# Patient Record
Sex: FEMALE | Race: BLACK OR AFRICAN AMERICAN | NOT HISPANIC OR LATINO | Employment: FULL TIME | URBAN - METROPOLITAN AREA
[De-identification: names, ages, dates, MRNs, and addresses within clinical notes are randomized per-mention and may not be internally consistent; named-entity substitution may affect disease eponyms.]

---

## 2017-10-24 DIAGNOSIS — Z12.39 SCREENING BREAST EXAMINATION: ICD-10-CM

## 2017-10-24 DIAGNOSIS — Z00.00 ROUTINE GENERAL MEDICAL EXAMINATION AT A HEALTH CARE FACILITY: ICD-10-CM

## 2017-10-24 DIAGNOSIS — Z13.820 SCREENING FOR OSTEOPOROSIS: Primary | ICD-10-CM

## 2017-10-30 DIAGNOSIS — Z12.39 SCREENING BREAST EXAMINATION: Primary | ICD-10-CM

## 2017-10-31 ENCOUNTER — OFFICE VISIT (OUTPATIENT)
Dept: UROGYNECOLOGY | Facility: CLINIC | Age: 57
End: 2017-10-31
Payer: COMMERCIAL

## 2017-10-31 ENCOUNTER — HOSPITAL ENCOUNTER (OUTPATIENT)
Dept: RADIOLOGY | Facility: HOSPITAL | Age: 57
Discharge: HOME | End: 2017-10-31
Attending: INTERNAL MEDICINE
Payer: COMMERCIAL

## 2017-10-31 ENCOUNTER — CLINICAL SUPPORT (OUTPATIENT)
Dept: INTERNAL MEDICINE | Facility: CLINIC | Age: 57
End: 2017-10-31
Payer: COMMERCIAL

## 2017-10-31 ENCOUNTER — HOSPITAL ENCOUNTER (OUTPATIENT)
Dept: CARDIOLOGY | Facility: CLINIC | Age: 57
Discharge: HOME | End: 2017-10-31
Payer: COMMERCIAL

## 2017-10-31 ENCOUNTER — HOSPITAL ENCOUNTER (OUTPATIENT)
Dept: RADIOLOGY | Facility: CLINIC | Age: 57
Discharge: HOME | End: 2017-10-31
Payer: COMMERCIAL

## 2017-10-31 ENCOUNTER — OFFICE VISIT (OUTPATIENT)
Dept: INTERNAL MEDICINE | Facility: CLINIC | Age: 57
End: 2017-10-31
Payer: COMMERCIAL

## 2017-10-31 VITALS
SYSTOLIC BLOOD PRESSURE: 124 MMHG | WEIGHT: 235 LBS | DIASTOLIC BLOOD PRESSURE: 60 MMHG | BODY MASS INDEX: 34.8 KG/M2 | HEIGHT: 69 IN

## 2017-10-31 VITALS
DIASTOLIC BLOOD PRESSURE: 86 MMHG | SYSTOLIC BLOOD PRESSURE: 126 MMHG | WEIGHT: 234.56 LBS | TEMPERATURE: 98 F | HEART RATE: 74 BPM | RESPIRATION RATE: 15 BRPM | HEIGHT: 69 IN | BODY MASS INDEX: 34.74 KG/M2

## 2017-10-31 DIAGNOSIS — N81.11 MIDLINE CYSTOCELE: ICD-10-CM

## 2017-10-31 DIAGNOSIS — Z01.419 WELL WOMAN EXAM: Primary | ICD-10-CM

## 2017-10-31 DIAGNOSIS — N81.6 RECTOCELE: ICD-10-CM

## 2017-10-31 DIAGNOSIS — E78.5 HYPERLIPIDEMIA, UNSPECIFIED HYPERLIPIDEMIA TYPE: ICD-10-CM

## 2017-10-31 DIAGNOSIS — Z00.00 ROUTINE GENERAL MEDICAL EXAMINATION AT A HEALTH CARE FACILITY: ICD-10-CM

## 2017-10-31 DIAGNOSIS — Z12.39 SCREENING BREAST EXAMINATION: ICD-10-CM

## 2017-10-31 DIAGNOSIS — N95.2 VAGINAL ATROPHY: ICD-10-CM

## 2017-10-31 DIAGNOSIS — I10 BENIGN ESSENTIAL HYPERTENSION: ICD-10-CM

## 2017-10-31 DIAGNOSIS — Z00.00 ANNUAL PHYSICAL EXAM: Primary | ICD-10-CM

## 2017-10-31 DIAGNOSIS — M85.89 OSTEOPENIA OF MULTIPLE SITES: ICD-10-CM

## 2017-10-31 DIAGNOSIS — N99.3 VAGINAL VAULT PROLAPSE AFTER HYSTERECTOMY: ICD-10-CM

## 2017-10-31 DIAGNOSIS — Z00.00 ROUTINE GENERAL MEDICAL EXAMINATION AT A HEALTH CARE FACILITY: Primary | ICD-10-CM

## 2017-10-31 DIAGNOSIS — Z13.820 SCREENING FOR OSTEOPOROSIS: ICD-10-CM

## 2017-10-31 LAB
ALBUMIN SERPL BCP-MCNC: 3.3 G/DL
ALP SERPL-CCNC: 103 U/L
ALT SERPL W/O P-5'-P-CCNC: 12 U/L
ANION GAP SERPL CALC-SCNC: 9 MMOL/L
AST SERPL-CCNC: 16 U/L
BILIRUB SERPL-MCNC: 0.4 MG/DL
BILIRUB UR QL STRIP: NEGATIVE
BUN SERPL-MCNC: 13 MG/DL
CALCIUM SERPL-MCNC: 9.7 MG/DL
CHLORIDE SERPL-SCNC: 105 MMOL/L
CHOLEST SERPL-MCNC: 239 MG/DL
CHOLEST/HDLC SERPL: 3.7 {RATIO}
CLARITY UR REFRACT.AUTO: CLEAR
CO2 SERPL-SCNC: 27 MMOL/L
COLOR UR AUTO: NORMAL
CREAT SERPL-MCNC: 1 MG/DL
ERYTHROCYTE [DISTWIDTH] IN BLOOD BY AUTOMATED COUNT: 13.2 %
EST. GFR  (AFRICAN AMERICAN): >60 ML/MIN/1.73 M^2
EST. GFR  (NON AFRICAN AMERICAN): >60 ML/MIN/1.73 M^2
GLUCOSE SERPL-MCNC: 100 MG/DL
GLUCOSE UR QL STRIP: NEGATIVE
HCT VFR BLD AUTO: 38.1 %
HDLC SERPL-MCNC: 65 MG/DL
HDLC SERPL: 27.2 %
HGB BLD-MCNC: 11.9 G/DL
HGB UR QL STRIP: NEGATIVE
KETONES UR QL STRIP: NEGATIVE
LDLC SERPL CALC-MCNC: 152.2 MG/DL
LEUKOCYTE ESTERASE UR QL STRIP: NEGATIVE
MCH RBC QN AUTO: 26.7 PG
MCHC RBC AUTO-ENTMCNC: 31.2 G/DL
MCV RBC AUTO: 86 FL
NITRITE UR QL STRIP: NEGATIVE
NONHDLC SERPL-MCNC: 174 MG/DL
PH UR STRIP: 5 [PH] (ref 5–8)
PLATELET # BLD AUTO: 326 K/UL
PMV BLD AUTO: 10.5 FL
POTASSIUM SERPL-SCNC: 4.3 MMOL/L
PROT SERPL-MCNC: 7.7 G/DL
PROT UR QL STRIP: NEGATIVE
RBC # BLD AUTO: 4.45 M/UL
SODIUM SERPL-SCNC: 141 MMOL/L
SP GR UR STRIP: 1.01 (ref 1–1.03)
TRIGL SERPL-MCNC: 109 MG/DL
URN SPEC COLLECT METH UR: NORMAL
UROBILINOGEN UR STRIP-ACNC: NEGATIVE EU/DL
WBC # BLD AUTO: 6.58 K/UL

## 2017-10-31 PROCEDURE — 93000 ELECTROCARDIOGRAM COMPLETE: CPT | Mod: S$GLB,,, | Performed by: INTERNAL MEDICINE

## 2017-10-31 PROCEDURE — 85027 COMPLETE CBC AUTOMATED: CPT

## 2017-10-31 PROCEDURE — 81003 URINALYSIS AUTO W/O SCOPE: CPT

## 2017-10-31 PROCEDURE — 77067 SCR MAMMO BI INCL CAD: CPT | Mod: 26,,, | Performed by: RADIOLOGY

## 2017-10-31 PROCEDURE — 77067 SCR MAMMO BI INCL CAD: CPT | Mod: TC

## 2017-10-31 PROCEDURE — 36415 COLL VENOUS BLD VENIPUNCTURE: CPT

## 2017-10-31 PROCEDURE — 99999 PR PBB SHADOW E&M-EST. PATIENT-LVL III: CPT | Mod: PBBFAC,,, | Performed by: NURSE PRACTITIONER

## 2017-10-31 PROCEDURE — G0101 CA SCREEN;PELVIC/BREAST EXAM: HCPCS | Mod: S$GLB,,, | Performed by: NURSE PRACTITIONER

## 2017-10-31 PROCEDURE — 80053 COMPREHEN METABOLIC PANEL: CPT

## 2017-10-31 PROCEDURE — 99999 PR PBB SHADOW E&M-EST. PATIENT-LVL III: CPT | Mod: PBBFAC,,, | Performed by: INTERNAL MEDICINE

## 2017-10-31 PROCEDURE — 77063 BREAST TOMOSYNTHESIS BI: CPT | Mod: 26,,, | Performed by: RADIOLOGY

## 2017-10-31 PROCEDURE — 99396 PREV VISIT EST AGE 40-64: CPT | Mod: S$GLB,,, | Performed by: INTERNAL MEDICINE

## 2017-10-31 PROCEDURE — 77080 DXA BONE DENSITY AXIAL: CPT | Mod: TC

## 2017-10-31 PROCEDURE — 77080 DXA BONE DENSITY AXIAL: CPT | Mod: 26,,, | Performed by: INTERNAL MEDICINE

## 2017-10-31 PROCEDURE — 80061 LIPID PANEL: CPT

## 2017-10-31 RX ORDER — METHOCARBAMOL 500 MG/1
500 TABLET, FILM COATED ORAL 4 TIMES DAILY
COMMUNITY
End: 2018-11-28

## 2017-10-31 RX ORDER — TRIAMTERENE AND HYDROCHLOROTHIAZIDE 37.5; 25 MG/1; MG/1
CAPSULE ORAL
COMMUNITY
Start: 2017-10-18

## 2017-10-31 RX ORDER — GABAPENTIN 300 MG/1
300 CAPSULE ORAL 3 TIMES DAILY
COMMUNITY
End: 2017-10-31

## 2017-10-31 RX ORDER — KETOCONAZOLE 20 MG/ML
SHAMPOO, SUSPENSION TOPICAL
COMMUNITY
Start: 2017-09-19

## 2017-10-31 RX ORDER — ETODOLAC 300 MG/1
300 CAPSULE ORAL 2 TIMES DAILY
COMMUNITY
End: 2017-10-31

## 2017-10-31 RX ORDER — DESONIDE 0.5 MG/G
OINTMENT TOPICAL
COMMUNITY
Start: 2017-07-28

## 2017-10-31 NOTE — PATIENT INSTRUCTIONS
1. Well woman  --mammogram schedule  --colonoscopy due by 2018    2. Cystocele stage 1, rectocele stage 1, vaginal vault prolpase stage 1  --asymptomatic at this time  --no lifting > 50 pounds without assistance, no straining with bowel movements    3. RTC 1 year

## 2017-10-31 NOTE — PROGRESS NOTES
INTERNAL MEDICINE ANNUAL VISIT NOTE      CHIEF COMPLAINT     Executive Health    HPI     Anne Marie Sanchezite is a 57 y.o. AA female who presents for [facility].    H/o palpitations-->toprol by cardiologist Dr. Gunn-->tiredness and decreased libido-->stopped med.     HTN - on triamterene-hctz 37.5-25mg daily. BP goes up when she has pain.      Osteoporosis on DEXA 2015 - boniva monthly ordered; pt didn't end up taking it. Just went to dentist this yr. Has been taking OTC multivitamins.  dexa today. Results pending.    Had fall at work - slipped on oily substance. Since then she's been having numbness in her hand, neck and shoulder area. Follows w/ orthopedist. Part of WC. Was on etodolac x 11 mo-->vomiting blood and diarrhea. Scheduled for EGD next Friday in [facility]. Also w/ R hip pain. Doing PT 3x/week. Reports did have some improvement but still w/ the pain.   Has PCP in [place] that she follows regularly.      Past Medical History:  Past Medical History:   Diagnosis Date    Fibroid tumor     Murmur, cardiac     Sleep apnea     TIA (transient ischemic attack)        Past Surgical History:  Past Surgical History:   Procedure Laterality Date    CHOLECYSTECTOMY      HYSTERECTOMY      TONSILLECTOMY, ADENOIDECTOMY         Allergies:  Review of patient's allergies indicates:  No Known Allergies    Home Medications:  Prior to Admission medications    Medication Sig Start Date End Date Taking? Authorizing Provider   desonide 0.05% (DESOWEN) 0.05 % Oint  7/28/17   Historical Provider, MD   etodolac (LODINE) 300 MG Cap Take 300 mg by mouth 2 (two) times daily.    Historical Provider, MD   gabapentin (NEURONTIN) 300 MG capsule Take 300 mg by mouth 3 (three) times daily.    Historical Provider, MD   ibandronate (BONIVA) 150 mg tablet 1 tab once monthly on empty stomach with a glass of water and stay upright for 60 minutes 10/21/16   Zuleima Stanton MD   ketoconazole (NIZORAL) 2 % shampoo  9/19/17   Historical  "Provider, MD   methocarbamol (ROBAXIN) 500 MG Tab Take 500 mg by mouth 4 (four) times daily.    Historical Provider, MD   triamterene-hydrochlorothiazide 37.5-25 mg (DYAZIDE) 37.5-25 mg per capsule  10/18/17   Historical Provider, MD       Family History:  Family History   Problem Relation Age of Onset    Clotting disorder Sister     Cancer Mother 62     breast CA    Breast cancer Mother     Cancer Father      prostate CA    Peripheral vascular disease Father     Prostate cancer Father     Ovarian cancer Neg Hx     Cervical cancer Neg Hx     Endometrial cancer Neg Hx     Vaginal cancer Neg Hx        Social History:  Social History   Substance Use Topics    Smoking status: Never Smoker    Smokeless tobacco: Never Used    Alcohol use Yes      Comment: occasionally       Review of Systems:  Review of Systems Comprehensive review of systems otherwise negative. See history/subjective section for more details.    PHYSICAL EXAM     /86   Pulse 74   Temp 98.4 °F (36.9 °C) (Oral)   Resp 15   Ht 5' 9" (1.753 m)   Wt 106.4 kg (234 lb 9.1 oz)   BMI 34.64 kg/m²     GEN - A+OX4, NAD   HEENT - PERRL, EOMI, OP clear. MMM.   Neck - No thyromegaly or cervical LAD. No thyroid masses felt.  CV - RRR, no m/r   Chest - CTAB, no wheezing or rhonchi  Abd - S/NT/ND/+BS.   Ext - 2+BDP and radial pulses. No LE edema.   Neuro - PERRL, EOMI, no nystagmus, eyebrow raise, facial sensation, hearing, m of mastication, smile, palatal raise, shoulder shrug, tongue protrusion symmetric and intact. 5/5 BUE and BLE strength.   Skin - No rash.    LABS     Previous labs reviewed.    ASSESSMENT/PLAN     Anne Marie Deleon is a 57 y.o. female with  Anne Marie was seen today for [facility] and [facility].    Diagnoses and all orders for this visit:    Annual physical exam - labs reviewed.     Benign essential hypertension - Stable and controlled. Continue current medications.    Hyperlipidemia, unspecified hyperlipidemia type - " discussed diet control.     Osteopenia of multiple sites - T score improved w/o bisphosphonates. Ca and Vit D. Light weight exercises as tolerated.     Pt will f/u w/ her doctors associated with workman's comp for the back and neck issues.    Follow up with your PCP in [place] for mild anemia - hgb 11.9.    RTC in 12 months, sooner if needed and depending on labs.    Zuleima Stanton MD  Department of Internal Medicine - [facility]  11:30 AM

## 2017-10-31 NOTE — LETTER
November 1, 2017    Anne Marie Favorite  [address] [place] [address]             [facility] - Internal Medicine  [address]  [facility] [address]  Phone: [phone]  Fax: [phone] Dear Ms. Sanchezite:    Thank you for allowing me to serve you and perform your Executive Health exam on 10/31/2017.  This letter will serve a brief summary of the history, physical findings, and laboratory/studies performed and recommendations at that time.    Reason for Visit: Executive Health Preventive Physical Examination    Past Medical History:   Diagnosis Date    Fibroid tumor     Murmur, cardiac     Sleep apnea     TIA (transient ischemic attack)        Past Surgical History:   Procedure Laterality Date    CHOLECYSTECTOMY      HYSTERECTOMY      OOPHORECTOMY      TONSILLECTOMY, ADENOIDECTOMY         Family History   Problem Relation Age of Onset    Clotting disorder Sister     Cancer Mother 62     breast CA    Breast cancer Mother     Cancer Father      prostate CA    Peripheral vascular disease Father     Prostate cancer Father     Ovarian cancer Neg Hx     Cervical cancer Neg Hx     Endometrial cancer Neg Hx     Vaginal cancer Neg Hx        Social History     Social History    Marital status: Single     Spouse name: N/A    Number of children: N/A    Years of education: N/A     Occupational History          Social History Main Topics    Smoking status: Never Smoker    Smokeless tobacco: Never Used    Alcohol use Yes      Comment: occasionally    Drug use: No    Sexual activity: Not Currently     Other Topics Concern    Not on file     Social History Narrative    No narrative on file      Review of patient's allergies indicates:  No Known Allergies    Current Outpatient Prescriptions:     desonide 0.05% (DESOWEN) 0.05 % Oint, , Disp: , Rfl:     ketoconazole (NIZORAL) 2 % shampoo, , Disp: , Rfl:     methocarbamol (ROBAXIN) 500 MG Tab, Take 500 mg by mouth 4  (four) times daily., Disp: , Rfl:     triamterene-hydrochlorothiazide 37.5-25 mg (DYAZIDE) 37.5-25 mg per capsule, , Disp: , Rfl:      Review of Systems  Review of Systems - Negative except numbness in the neck, shoulder and hand.    Physical Exam:  General: General appearance: alert, well appearing, and in no distress.   Skin: Skin exam - normal coloration and turgor, no rashes, no suspicious skin lesions noted.  HEENT: Ears - bilateral TM's and external ear canals normal. , ENT exam reveals - ENT exam normal, no neck nodes or sinus tenderness.   Lungs: Chest: clear to auscultation, no wheezes, rales or rhonchi, symmetric air entry.   Heart: CVS exam: normal rate, regular rhythm, normal S1, S2, no murmurs, rubs, clicks or gallops.   Extremities: Exam of extremities: peripheral pulses normal, no pedal edema, no clubbing or cyanosis    Labs:  Results for orders placed or performed in visit on 10/31/17   Comprehensive metabolic panel   Result Value Ref Range    Sodium 141 136 - 145 mmol/L    Potassium 4.3 3.5 - 5.1 mmol/L    Chloride 105 95 - 110 mmol/L    CO2 27 23 - 29 mmol/L    Glucose 100 70 - 110 mg/dL    BUN, Bld 13 6 - 20 mg/dL    Creatinine 1.0 0.5 - 1.4 mg/dL    Calcium 9.7 8.7 - 10.5 mg/dL    Total Protein 7.7 6.0 - 8.4 g/dL    Albumin 3.3 (L) 3.5 - 5.2 g/dL    Total Bilirubin 0.4 0.1 - 1.0 mg/dL    Alkaline Phosphatase 103 55 - 135 U/L    AST 16 10 - 40 U/L    ALT 12 10 - 44 U/L    Anion Gap 9 8 - 16 mmol/L    eGFR if African American >60.0 >60 mL/min/1.73 m^2    eGFR if non African American >60.0 >60 mL/min/1.73 m^2   CBC Without Differential   Result Value Ref Range    WBC 6.58 3.90 - 12.70 K/uL    RBC 4.45 4.00 - 5.40 M/uL    Hemoglobin 11.9 (L) 12.0 - 16.0 g/dL    Hematocrit 38.1 37.0 - 48.5 %    MCV 86 82 - 98 fL    MCH 26.7 (L) 27.0 - 31.0 pg    MCHC 31.2 (L) 32.0 - 36.0 g/dL    RDW 13.2 11.5 - 14.5 %    Platelets 326 150 - 350 K/uL    MPV 10.5 9.2 - 12.9 fL   Lipid panel   Result Value Ref Range     Cholesterol 239 (H) 120 - 199 mg/dL    Triglycerides 109 30 - 150 mg/dL    HDL 65 40 - 75 mg/dL    LDL Cholesterol 152.2 63.0 - 159.0 mg/dL    HDL/Chol Ratio 27.2 20.0 - 50.0 %    Total Cholesterol/HDL Ratio 3.7 2.0 - 5.0    Non-HDL Cholesterol 174 mg/dL   Urinalysis   Result Value Ref Range    Specimen UA Urine, Clean Catch     Color, UA Straw Yellow, Straw, Lisa    Appearance, UA Clear Clear    pH, UA 5.0 5.0 - 8.0    Specific Gravity, UA 1.015 1.005 - 1.030    Protein, UA Negative Negative    Glucose, UA Negative Negative    Ketones, UA Negative Negative    Bilirubin (UA) Negative Negative    Occult Blood UA Negative Negative    Nitrite, UA Negative Negative    Urobilinogen, UA Negative <2.0 EU/dL    Leukocytes, UA Negative Negative      Assessment/Recommendations:  Routine Health Maintenance    As discussed, I recommend following up with your Primary Care Doctor in [place] to see if further work up is necessary for the mild anemia.     Your cholesterol is high but plugging in all your risk factors into the calculator, your risk of having a cardiovascular event is <7.5% in the next 10 yrs. Therefore under guidelines, you do not meet criteria for treatment. However I do recommend a low fat diet.     Your bone density scan has actually improved immensely and no longer in the osteoporosis range. Keep doing what you're doing! I recommend taking calcium 1200mg and vitamin D 1000 units daily along with light weight exercises to keep up bone health.    If you have any questions or concerns, please don't hesitate to call.    Sincerely,        Zuleima Stanton MD

## 2017-10-31 NOTE — PROGRESS NOTES
"10/31/2017    SUBJECTIVE:   57 y.o. female for annual exam.    Past Medical History:   Diagnosis Date    Fibroid tumor     Murmur, cardiac     Sleep apnea     TIA (transient ischemic attack)        Past Surgical History:   Procedure Laterality Date    CHOLECYSTECTOMY      HYSTERECTOMY      OOPHORECTOMY      TONSILLECTOMY, ADENOIDECTOMY         Current Outpatient Prescriptions   Medication Sig Dispense Refill    desonide 0.05% (DESOWEN) 0.05 % Oint       ketoconazole (NIZORAL) 2 % shampoo       methocarbamol (ROBAXIN) 500 MG Tab Take 500 mg by mouth 4 (four) times daily.      triamterene-hydrochlorothiazide 37.5-25 mg (DYAZIDE) 37.5-25 mg per capsule        No current facility-administered medications for this visit.      Allergies: Patient has no known allergies.   No LMP recorded. Patient has had a hysterectomy.  Due fibroids in 2008    + breast cancer mother    Denies family history of uterine, cervical, ovarian, kidney, bladder, or colon cancers.    Well Woman:  Pap:03/2009 normal-- post hysterectomy  Mammo:10/2016 normal-- scheduled  Colonoscopy:05/2013 normal.  Impression:           - One 6 mm polyp in the ascending colon. Resected and retrieved.                        - The examined portion of the ileum was normal.  Recommendation:       - Repeat colonoscopy in 3 - 5 years for surveillance based on pathology results.  Dexa:scheduled    ROS:  Feeling well.   No dyspnea or chest pain on exertion.    No abdominal pain, change in bowel habits, ? Blood in stool-- may be due to etodolac--scheduled to see GI  + urinary frequency due to diuretic.  Nocturia 3/night-- takes diuretic at night.  GYN ROS: no breast pain or new or enlarging lumps on self exam, no vaginal bleeding.   No neurological complaints.    OBJECTIVE:   The patient appears well, alert, oriented x 3, in no distress.  /60   Ht 5' 9" (1.753 m)   Wt 106.6 kg (235 lb 0.2 oz)   BMI 34.71 kg/m²   ENT normal.  Neck supple. No adenopathy " or thyromegaly. JOHANNA.   Lungs are clear, good air entry, no wheezes, rhonchi or rales.   S1 and S2 normal, no murmurs, regular rate and rhythm.   Abdomen soft without tenderness, guarding, mass or organomegaly.   Extremities show no edema, normal peripheral pulses.   Neurological is normal, no focal findings.    BREAST EXAM: breasts appear normal, no suspicious masses, no skin or nipple changes or axillary nodes    PELVIC EXAM:   VULVA: normal appearing vulva with no masses, tenderness or lesions,   VAGINA: normal appearing vagina with normal color and discharge, no lesions,   PELVIC FLOOR EXAM: rectocele stage 1, cystocele stage 1, vaginal prolapse stage1,  CERVIX: surgically absent, UTERUS: absent,   ADNEXA: no masses,   RECTAL: normal rectal, no masses    ASSESSMENT:   1. Well woman exam     2. Midline cystocele     3. Rectocele     4. Vaginal vault prolapse after hysterectomy     5. Vaginal atrophy         PLAN:     1. Well woman  --mammogram schedule  --colonoscopy due by 2018    2. Cystocele stage 1, rectocele stage 1, vaginal vault prolpase stage 1  --asymptomatic at this time  --no lifting > 50 pounds without assistance, no straining with bowel movements    3. RTC 1 year    30 minutes were spent in face to face time with this patient  75 % of this time was spent in counseling and/or coordination of care  Blanca JORDAN Christoph  [facility]  Division of Female Pelvic Medicine and Reconstructive Surgery  Department of Obstetrics & Gynecology

## 2018-11-15 DIAGNOSIS — Z12.39 SCREENING BREAST EXAMINATION: Primary | ICD-10-CM

## 2018-11-15 DIAGNOSIS — Z00.00 ROUTINE GENERAL MEDICAL EXAMINATION AT A HEALTH CARE FACILITY: Primary | ICD-10-CM

## 2018-11-28 ENCOUNTER — OFFICE VISIT (OUTPATIENT)
Dept: INTERNAL MEDICINE | Facility: CLINIC | Age: 58
End: 2018-11-28
Payer: COMMERCIAL

## 2018-11-28 ENCOUNTER — HOSPITAL ENCOUNTER (OUTPATIENT)
Dept: RADIOLOGY | Facility: HOSPITAL | Age: 58
Discharge: HOME | End: 2018-11-28
Attending: INTERNAL MEDICINE
Payer: COMMERCIAL

## 2018-11-28 ENCOUNTER — CLINICAL SUPPORT (OUTPATIENT)
Dept: INTERNAL MEDICINE | Facility: CLINIC | Age: 58
End: 2018-11-28
Payer: COMMERCIAL

## 2018-11-28 ENCOUNTER — HOSPITAL ENCOUNTER (OUTPATIENT)
Dept: CARDIOLOGY | Facility: CLINIC | Age: 58
Discharge: HOME | End: 2018-11-28
Payer: COMMERCIAL

## 2018-11-28 VITALS
SYSTOLIC BLOOD PRESSURE: 112 MMHG | WEIGHT: 211.63 LBS | TEMPERATURE: 98 F | BODY MASS INDEX: 31.34 KG/M2 | HEIGHT: 69 IN | HEART RATE: 90 BPM | DIASTOLIC BLOOD PRESSURE: 72 MMHG

## 2018-11-28 DIAGNOSIS — Z00.00 ROUTINE GENERAL MEDICAL EXAMINATION AT A HEALTH CARE FACILITY: Primary | ICD-10-CM

## 2018-11-28 DIAGNOSIS — G89.29 CHRONIC BACK PAIN, UNSPECIFIED BACK LOCATION, UNSPECIFIED BACK PAIN LATERALITY: ICD-10-CM

## 2018-11-28 DIAGNOSIS — Z00.00 ANNUAL PHYSICAL EXAM: Primary | ICD-10-CM

## 2018-11-28 DIAGNOSIS — I10 ESSENTIAL HYPERTENSION: ICD-10-CM

## 2018-11-28 DIAGNOSIS — M54.9 CHRONIC BACK PAIN, UNSPECIFIED BACK LOCATION, UNSPECIFIED BACK PAIN LATERALITY: ICD-10-CM

## 2018-11-28 DIAGNOSIS — Z00.00 ROUTINE GENERAL MEDICAL EXAMINATION AT A HEALTH CARE FACILITY: ICD-10-CM

## 2018-11-28 DIAGNOSIS — L65.9 ALOPECIA: ICD-10-CM

## 2018-11-28 DIAGNOSIS — Z12.39 SCREENING BREAST EXAMINATION: ICD-10-CM

## 2018-11-28 DIAGNOSIS — E78.5 HYPERLIPIDEMIA, UNSPECIFIED HYPERLIPIDEMIA TYPE: ICD-10-CM

## 2018-11-28 PROBLEM — M85.852 OSTEOPENIA OF BOTH HIPS: Status: ACTIVE | Noted: 2018-11-28

## 2018-11-28 PROBLEM — M85.851 OSTEOPENIA OF BOTH HIPS: Status: ACTIVE | Noted: 2018-11-28

## 2018-11-28 PROBLEM — K21.9 GASTROESOPHAGEAL REFLUX DISEASE: Status: ACTIVE | Noted: 2018-11-28

## 2018-11-28 LAB
ALBUMIN SERPL BCP-MCNC: 3.6 G/DL
ALP SERPL-CCNC: 97 U/L
ALT SERPL W/O P-5'-P-CCNC: 12 U/L
ANION GAP SERPL CALC-SCNC: 9 MMOL/L
AST SERPL-CCNC: 16 U/L
BACTERIA #/AREA URNS AUTO: NORMAL /HPF
BILIRUB SERPL-MCNC: 0.3 MG/DL
BILIRUB UR QL STRIP: NEGATIVE
BUN SERPL-MCNC: 19 MG/DL
CALCIUM SERPL-MCNC: 9.9 MG/DL
CHLORIDE SERPL-SCNC: 104 MMOL/L
CHOLEST SERPL-MCNC: 221 MG/DL
CHOLEST/HDLC SERPL: 2.9 {RATIO}
CLARITY UR REFRACT.AUTO: CLEAR
CO2 SERPL-SCNC: 25 MMOL/L
COLOR UR AUTO: YELLOW
CREAT SERPL-MCNC: 0.9 MG/DL
ERYTHROCYTE [DISTWIDTH] IN BLOOD BY AUTOMATED COUNT: 13.3 %
EST. GFR  (AFRICAN AMERICAN): >60 ML/MIN/1.73 M^2
EST. GFR  (NON AFRICAN AMERICAN): >60 ML/MIN/1.73 M^2
GLUCOSE SERPL-MCNC: 99 MG/DL
GLUCOSE UR QL STRIP: NEGATIVE
HCT VFR BLD AUTO: 40.7 %
HDLC SERPL-MCNC: 76 MG/DL
HDLC SERPL: 34.4 %
HGB BLD-MCNC: 13.1 G/DL
HGB UR QL STRIP: ABNORMAL
HYALINE CASTS UR QL AUTO: 1 /LPF
KETONES UR QL STRIP: NEGATIVE
LDLC SERPL CALC-MCNC: 130.8 MG/DL
LEUKOCYTE ESTERASE UR QL STRIP: NEGATIVE
MCH RBC QN AUTO: 27.2 PG
MCHC RBC AUTO-ENTMCNC: 32.2 G/DL
MCV RBC AUTO: 85 FL
MICROSCOPIC COMMENT: NORMAL
NITRITE UR QL STRIP: NEGATIVE
NONHDLC SERPL-MCNC: 145 MG/DL
PH UR STRIP: 5 [PH] (ref 5–8)
PLATELET # BLD AUTO: 336 K/UL
PMV BLD AUTO: 10.9 FL
POTASSIUM SERPL-SCNC: 4.2 MMOL/L
PROT SERPL-MCNC: 7.9 G/DL
PROT UR QL STRIP: NEGATIVE
RBC # BLD AUTO: 4.81 M/UL
RBC #/AREA URNS AUTO: 0 /HPF (ref 0–4)
SODIUM SERPL-SCNC: 138 MMOL/L
SP GR UR STRIP: 1.02 (ref 1–1.03)
SQUAMOUS #/AREA URNS AUTO: 0 /HPF
TRIGL SERPL-MCNC: 71 MG/DL
URN SPEC COLLECT METH UR: ABNORMAL
WBC # BLD AUTO: 11.24 K/UL
WBC #/AREA URNS AUTO: 0 /HPF (ref 0–5)

## 2018-11-28 PROCEDURE — 99396 PREV VISIT EST AGE 40-64: CPT | Mod: S$GLB,,, | Performed by: INTERNAL MEDICINE

## 2018-11-28 PROCEDURE — 80053 COMPREHEN METABOLIC PANEL: CPT

## 2018-11-28 PROCEDURE — 81001 URINALYSIS AUTO W/SCOPE: CPT

## 2018-11-28 PROCEDURE — 80061 LIPID PANEL: CPT

## 2018-11-28 PROCEDURE — 99999 PR PBB SHADOW E&M-EST. PATIENT-LVL III: CPT | Mod: PBBFAC,,, | Performed by: INTERNAL MEDICINE

## 2018-11-28 PROCEDURE — 93005 ELECTROCARDIOGRAM TRACING: CPT | Mod: S$GLB,,, | Performed by: INTERNAL MEDICINE

## 2018-11-28 PROCEDURE — 85027 COMPLETE CBC AUTOMATED: CPT

## 2018-11-28 PROCEDURE — 77067 SCR MAMMO BI INCL CAD: CPT | Mod: TC

## 2018-11-28 PROCEDURE — 77067 SCR MAMMO BI INCL CAD: CPT | Mod: 26,,, | Performed by: RADIOLOGY

## 2018-11-28 PROCEDURE — 77063 BREAST TOMOSYNTHESIS BI: CPT | Mod: 26,,, | Performed by: RADIOLOGY

## 2018-11-28 PROCEDURE — 93010 ELECTROCARDIOGRAM REPORT: CPT | Mod: S$GLB,,, | Performed by: INTERNAL MEDICINE

## 2018-11-28 PROCEDURE — 77063 BREAST TOMOSYNTHESIS BI: CPT | Mod: TC

## 2018-11-28 RX ORDER — MAGNESIUM 250 MG
TABLET ORAL DAILY
COMMUNITY

## 2018-11-28 RX ORDER — TIZANIDINE 4 MG/1
4 TABLET ORAL NIGHTLY
COMMUNITY

## 2018-11-28 RX ORDER — CHLORZOXAZONE 750 MG/1
750 TABLET ORAL DAILY
COMMUNITY

## 2018-11-28 RX ORDER — AMOXICILLIN 500 MG
CAPSULE ORAL DAILY
COMMUNITY

## 2018-11-28 RX ORDER — HYDROCODONE BITARTRATE AND ACETAMINOPHEN 5; 325 MG/1; MG/1
1 TABLET ORAL 2 TIMES DAILY PRN
COMMUNITY

## 2018-11-28 NOTE — LETTER
November 28, 2018    Anne Marie Favorite  [address] [place] [address]             [facility] Internal [facility]  [address]  [place] [address]  Phone: [phone]  Fax: [phone] Dear Ms. Sanchezite:    Thank you for allowing me to serve you and perform your Executive Health exam on 11/28/2018.  This letter will serve a brief summary of the history, physical findings, and laboratory/studies performed and recommendations at that time.    Reason for Visit: Executive Health Preventive Physical Examination    Past Medical History:   Diagnosis Date    Fibroid tumor     Gastrointestinal bleed     due to NSAIDs    H. pylori infection     Murmur, cardiac     Sleep apnea     TIA (transient ischemic attack)        Past Surgical History:   Procedure Laterality Date    CERVICAL FUSION  03/21/2018    CHOLECYSTECTOMY      COLONOSCOPY N/A 5/3/2013    Performed by FLACO Lopez MD at [facility] ([facility])    HYSTERECTOMY      OOPHORECTOMY      TONSILLECTOMY, ADENOIDECTOMY         Family History   Problem Relation Age of Onset    Clotting disorder Sister     Cancer Mother 62        breast CA    Breast cancer Mother     Cancer Father         prostate CA    Peripheral vascular disease Father     Prostate cancer Father     Ovarian cancer Neg Hx     Cervical cancer Neg Hx     Endometrial cancer Neg Hx     Vaginal cancer Neg Hx        Social History     Socioeconomic History    Marital status: Single     Spouse name: Not on file    Number of children: Not on file    Years of education: Not on file    Highest education level: Not on file   Social Needs    Financial resource strain: Not on file    Food insecurity - worry: Not on file    Food insecurity - inability: Not on file    Transportation needs - medical: Not on file    Transportation needs - non-medical: Not on file   Occupational History    Occupation:    Tobacco Use    Smoking status: Never Smoker     Smokeless tobacco: Never Used   Substance and Sexual Activity    Alcohol use: Yes     Comment: occasionally    Drug use: No    Sexual activity: Not Currently   Other Topics Concern    Not on file   Social History Narrative    Not on file        Review of patient's allergies indicates:  No Known Allergies      Current Outpatient Medications:     chlorzoxazone (LORZONE) 750 mg Tab, Take 750 mg by mouth once daily., Disp: , Rfl:     desonide 0.05% (DESOWEN) 0.05 % Oint, , Disp: , Rfl:     fish oil-omega-3 fatty acids 300-1,000 mg capsule, Take by mouth once daily., Disp: , Rfl:     HYDROcodone-acetaminophen (NORCO) 5-325 mg per tablet, Take 1 tablet by mouth 2 (two) times daily as needed for Pain., Disp: , Rfl:     ketoconazole (NIZORAL) 2 % shampoo, , Disp: , Rfl:     magnesium 250 mg Tab, Take by mouth once daily., Disp: , Rfl:     tiZANidine (ZANAFLEX) 4 MG tablet, Take 4 mg by mouth every evening., Disp: , Rfl:     triamterene-hydrochlorothiazide 37.5-25 mg (DYAZIDE) 37.5-25 mg per capsule, , Disp: , Rfl:      Review of Systems  Review of Systems - Negative except neck and mid back pain and hair loss.    Physical Exam:  General: General appearance: alert, well appearing, and in no distress.   Skin: Skin exam - normal coloration and turgor, no rashes, no suspicious skin lesions noted  HAIR: hair exam is normal without alopecia or scalp lesions, scalp dermatitis.  HEENT: Ears - bilateral TM's and external ear canals normal. , ENT exam reveals - ENT exam normal, no neck nodes or sinus tenderness.   Lungs: Chest: clear to auscultation, no wheezes, rales or rhonchi, symmetric air entry.   Heart: CVS exam: normal rate, regular rhythm, normal S1, S2, no murmurs, rubs, clicks or gallops.   Extremities: Exam of extremities: peripheral pulses normal, no pedal edema, no clubbing or cyanosis    Labs:  Results for orders placed or performed in visit on 11/28/18   Comprehensive metabolic panel   Result Value Ref  Range    Sodium 138 136 - 145 mmol/L    Potassium 4.2 3.5 - 5.1 mmol/L    Chloride 104 95 - 110 mmol/L    CO2 25 23 - 29 mmol/L    Glucose 99 70 - 110 mg/dL    BUN, Bld 19 6 - 20 mg/dL    Creatinine 0.9 0.5 - 1.4 mg/dL    Calcium 9.9 8.7 - 10.5 mg/dL    Total Protein 7.9 6.0 - 8.4 g/dL    Albumin 3.6 3.5 - 5.2 g/dL    Total Bilirubin 0.3 0.1 - 1.0 mg/dL    Alkaline Phosphatase 97 55 - 135 U/L    AST 16 10 - 40 U/L    ALT 12 10 - 44 U/L    Anion Gap 9 8 - 16 mmol/L    eGFR if African American >60.0 >60 mL/min/1.73 m^2    eGFR if non African American >60.0 >60 mL/min/1.73 m^2   CBC Without Differential   Result Value Ref Range    WBC 11.24 3.90 - 12.70 K/uL    RBC 4.81 4.00 - 5.40 M/uL    Hemoglobin 13.1 12.0 - 16.0 g/dL    Hematocrit 40.7 37.0 - 48.5 %    MCV 85 82 - 98 fL    MCH 27.2 27.0 - 31.0 pg    MCHC 32.2 32.0 - 36.0 g/dL    RDW 13.3 11.5 - 14.5 %    Platelets 336 150 - 350 K/uL    MPV 10.9 9.2 - 12.9 fL   Lipid panel   Result Value Ref Range    Cholesterol 221 (H) 120 - 199 mg/dL    Triglycerides 71 30 - 150 mg/dL    HDL 76 (H) 40 - 75 mg/dL    LDL Cholesterol 130.8 63.0 - 159.0 mg/dL    HDL/Chol Ratio 34.4 20.0 - 50.0 %    Total Cholesterol/HDL Ratio 2.9 2.0 - 5.0    Non-HDL Cholesterol 145 mg/dL   Urinalysis   Result Value Ref Range    Specimen UA Urine, Clean Catch     Color, UA Yellow Yellow, Straw, Lisa    Appearance, UA Clear Clear    pH, UA 5.0 5.0 - 8.0    Specific Gravity, UA 1.020 1.005 - 1.030    Protein, UA Negative Negative    Glucose, UA Negative Negative    Ketones, UA Negative Negative    Bilirubin (UA) Negative Negative    Occult Blood UA 1+ (A) Negative    Nitrite, UA Negative Negative    Leukocytes, UA Negative Negative   Urinalysis Microscopic   Result Value Ref Range    RBC, UA 0 0 - 4 /hpf    WBC, UA 0 0 - 5 /hpf    Bacteria, UA Occasional None-Occ /hpf    Squam Epithel, UA 0 /hpf    Hyaline Casts, UA 1 0-1/lpf /lpf    Microscopic Comment SEE COMMENT          Assessment/Recommendations:  Routine Health Maintenance    As discussed, I recommend to have your thyroid checked with your Primary Care Physician due to the hair loss. Follow up with your dermatologist also. Due to your age, I recommend a once in a lifetime blood work to screen for Hepatitis C if it has never been done.     Continue to follow up with Dr. Bansal for your back pain. Please remember to tell him about your history of gastrointestinal bleed due to medicines. Avoid all NSAIDs (non-steroidal anti-inflammatory drugs) such as ibuprofen, Aleve, Advil, Goody's, BC powder, etc.     If you have any questions or concerns, please don't hesitate to call. I look forward to seeing you next year!    Sincerely,        Zuleima Stanton MD

## 2018-11-28 NOTE — PROGRESS NOTES
INTERNAL MEDICINE VISIT NOTE      CHIEF COMPLAINT     Chief Complaint   Patient presents with    [facility]     HPI     Anne Marie Sanchezite is a 58 y.o. AA female who presents for [facility].    HTN - triamterene-hctz 37.5-25mg daily.     Osteoporosis on DEXA 2015 - was on boniva. Repeat DEXA 10/31/17 w/ osteopenia. Stopped on bisphosphonates. T score of L spine -1.8 and hip -0.1; femoral neck -0.7.    Last MMG 10/31/17 - neg.  Mom had breast CA at 62 and dad w/ h/o prostate CA.    Had cervical spine fusion w/ Dr. Bansal. Still w/ some pain in the back of the neck. Still w/ some minor numbness/tingling in hands but improved. S/p PT for the C spine. However pt also has R mid back pain and also lower back pain and R hip pain. About to undergo PT for the mid back.   On norco 5-325mg BID, tizanidine 4mg nightly and lorzone 750mg daily.   H/o GIB due to NSAIDs.    Hair loss myriam at top of head. Psoriasis of scalp s/p injection yesterday. Outside dermatologist.     Past Medical History:  Past Medical History:   Diagnosis Date    Fibroid tumor     Murmur, cardiac     Sleep apnea     TIA (transient ischemic attack)        Past Surgical History:  Past Surgical History:   Procedure Laterality Date    CERVICAL FUSION  03/21/2018    CHOLECYSTECTOMY      COLONOSCOPY N/A 5/3/2013    Performed by FLACO Lopez MD at [facility] (4[facility] F[facility])    HYSTERECTOMY      OOPHORECTOMY      TONSILLECTOMY, ADENOIDECTOMY         Allergies:  Review of patient's allergies indicates:  No Known Allergies    Home Medications:  Labs reviewed    Family History:  Family History   Problem Relation Age of Onset    Clotting disorder Sister     Cancer Mother 62        breast CA    Breast cancer Mother     Cancer Father         prostate CA    Peripheral vascular disease Father     Prostate cancer Father     Ovarian cancer Neg Hx     Cervical cancer Neg Hx     Endometrial cancer Neg Hx     Vaginal cancer Neg Hx        Social  "History:  Social History     Tobacco Use    Smoking status: Never Smoker    Smokeless tobacco: Never Used   Substance Use Topics    Alcohol use: Yes     Comment: occasionally    Drug use: No     Review of Systems:  Review of Systems Comprehensive review of systems otherwise negative. See history/subjective section for more details.    Health Maintainence:    reviewed.     PHYSICAL EXAM     /72 (BP Location: Left arm, Patient Position: Sitting, BP Method: Large (Manual))   Pulse 90   Temp 97.6 °F (36.4 °C)   Ht 5' 9" (1.753 m)   Wt 96 kg (211 lb 10.3 oz)   BMI 31.25 kg/m²     GEN - A+OX4, NAD   HEENT - PERRL, EOMI, OP clear. MMM. Alopecia.  Neck - No thyromegaly or cervical LAD. No thyroid masses felt.  CV - RRR, no m/r   Chest - CTAB, no wheezing or rhonchi  Abd - S/NT/ND/+BS.   Ext - 2+BDP and radial pulses. No LE edema.   Neuro - PERRL, EOMI, no nystagmus, eyebrow raise, facial sensation, hearing, m of mastication, smile, palatal raise, shoulder shrug, tongue protrusion symmetric and intact. 5/5 BUE and BLE strength. Sensation to light touch intact throughout. Decreased DTRs. Normal gait.   MSK - No spinal tenderness to palpation. Normal gait.   Skin - No rash.    LABS     Previous labs reviewed.    ASSESSMENT/PLAN     Anne Marie Deleon is a 58 y.o. female with  Anne Marie was seen today for [facility].    Diagnoses and all orders for this visit:    Annual physical exam - labs reviewed w/ pt. Declines flu vaccine.    Essential hypertension - Stable and controlled. Continue current medications.    Hyperlipidemia, unspecified hyperlipidemia type - rec low fat diet.     Chronic back pain, unspecified back location, unspecified back pain laterality - f/u w/ Dr. Bansal. Recommended pt to bring to attention that she's had h/o GIB and should not be on NSAIDs (she's not on it currently). Recommend if needed to take anything OTC, do topicals or tylenol prn.     Alopecia - recommended pt to get TSH " checked.     BMI 31.25 - discussed risks w/ obesity. Discussed exercise and diet. Exercise limited by chronic pain. Decrease caloric intake - either portion control or changing out types of food. Smaller, more frequent meals.    Other orders  -     HYDROcodone-acetaminophen (NORCO) 5-325 mg per tablet; Take 1 tablet by mouth 2 (two) times daily as needed for Pain.  -     chlorzoxazone (LORZONE) 750 mg Tab; Take 750 mg by mouth once daily.  -     tiZANidine (ZANAFLEX) 4 MG tablet; Take 4 mg by mouth every evening.  -     magnesium 250 mg Tab; Take by mouth once daily.  -     fish oil-omega-3 fatty acids 300-1,000 mg capsule; Take by mouth once daily.    Read EKG on own. NSR.    She will be getting her MMG today.  RTC in 12 months, sooner if needed and depending on labs.    Zuleima Stanton MD  Department of Internal Medicine - [facility]  9:53 AM

## 2019-09-13 DIAGNOSIS — Z00.00 ROUTINE GENERAL MEDICAL EXAMINATION AT A HEALTH CARE FACILITY: Primary | ICD-10-CM

## 2019-09-13 DIAGNOSIS — Z13.820 SCREENING FOR OSTEOPOROSIS: ICD-10-CM

## 2019-12-01 ENCOUNTER — PATIENT OUTREACH (OUTPATIENT)
Dept: ADMINISTRATIVE | Facility: OTHER | Age: 59
End: 2019-12-01

## 2021-12-15 ENCOUNTER — OUTSIDE PLACE OF SERVICE (OUTPATIENT)
Dept: CARDIOLOGY | Facility: CLINIC | Age: 61
End: 2021-12-15
Payer: MEDICARE

## 2021-12-15 PROCEDURE — 93010 PR ELECTROCARDIOGRAM REPORT: ICD-10-PCS | Mod: ,,, | Performed by: INTERNAL MEDICINE

## 2021-12-15 PROCEDURE — 93010 ELECTROCARDIOGRAM REPORT: CPT | Mod: ,,, | Performed by: INTERNAL MEDICINE
